# Patient Record
Sex: FEMALE | Race: WHITE | URBAN - METROPOLITAN AREA
[De-identification: names, ages, dates, MRNs, and addresses within clinical notes are randomized per-mention and may not be internally consistent; named-entity substitution may affect disease eponyms.]

---

## 2022-11-20 ENCOUNTER — EMERGENCY (EMERGENCY)
Facility: HOSPITAL | Age: 82
LOS: 1 days | Discharge: HOME | End: 2022-11-20
Attending: STUDENT IN AN ORGANIZED HEALTH CARE EDUCATION/TRAINING PROGRAM | Admitting: STUDENT IN AN ORGANIZED HEALTH CARE EDUCATION/TRAINING PROGRAM
Payer: MEDICARE

## 2022-11-20 VITALS
DIASTOLIC BLOOD PRESSURE: 60 MMHG | RESPIRATION RATE: 18 BRPM | HEART RATE: 98 BPM | SYSTOLIC BLOOD PRESSURE: 102 MMHG | OXYGEN SATURATION: 99 %

## 2022-11-20 VITALS
RESPIRATION RATE: 18 BRPM | OXYGEN SATURATION: 93 % | HEART RATE: 65 BPM | DIASTOLIC BLOOD PRESSURE: 53 MMHG | SYSTOLIC BLOOD PRESSURE: 111 MMHG | TEMPERATURE: 99 F

## 2022-11-20 DIAGNOSIS — E78.5 HYPERLIPIDEMIA, UNSPECIFIED: ICD-10-CM
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
## 2022-11-20 DIAGNOSIS — S40.021A CONTUSION OF RIGHT UPPER ARM, INITIAL ENCOUNTER: ICD-10-CM

## 2022-11-20 DIAGNOSIS — I48.91 UNSPECIFIED ATRIAL FIBRILLATION: ICD-10-CM

## 2022-11-20 DIAGNOSIS — S42.251A DISPLACED FRACTURE OF GREATER TUBEROSITY OF RIGHT HUMERUS, INITIAL ENCOUNTER FOR CLOSED FRACTURE: ICD-10-CM
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
## 2022-11-20 DIAGNOSIS — Z20.822 CONTACT WITH AND (SUSPECTED) EXPOSURE TO COVID-19: ICD-10-CM

## 2022-11-20 DIAGNOSIS — Z86.73 PERSONAL HISTORY OF TRANSIENT ISCHEMIC ATTACK (TIA), AND CEREBRAL INFARCTION WITHOUT RESIDUAL DEFICITS: ICD-10-CM

## 2022-11-20 DIAGNOSIS — Z91.018 ALLERGY TO OTHER FOODS: ICD-10-CM

## 2022-11-20 DIAGNOSIS — Z74.01 BED CONFINEMENT STATUS: ICD-10-CM

## 2022-11-20 DIAGNOSIS — Z79.01 LONG TERM (CURRENT) USE OF ANTICOAGULANTS: ICD-10-CM

## 2022-11-20 DIAGNOSIS — I10 ESSENTIAL (PRIMARY) HYPERTENSION: ICD-10-CM

## 2022-11-20 DIAGNOSIS — F03.90 UNSPECIFIED DEMENTIA WITHOUT BEHAVIORAL DISTURBANCE: ICD-10-CM

## 2022-11-20 DIAGNOSIS — E11.9 TYPE 2 DIABETES MELLITUS WITHOUT COMPLICATIONS: ICD-10-CM

## 2022-11-20 LAB
ALBUMIN SERPL ELPH-MCNC: 2.9 G/DL — LOW (ref 3.3–5)
ALP SERPL-CCNC: 113 U/L — SIGNIFICANT CHANGE UP (ref 40–120)
ALT FLD-CCNC: 10 U/L — SIGNIFICANT CHANGE UP (ref 10–45)
ANION GAP SERPL CALC-SCNC: 8 MMOL/L — SIGNIFICANT CHANGE UP (ref 5–17)
APTT BLD: 39.5 SEC — HIGH (ref 27.5–35.5)
AST SERPL-CCNC: 16 U/L — SIGNIFICANT CHANGE UP (ref 10–40)
BASOPHILS # BLD AUTO: 0.05 K/UL — SIGNIFICANT CHANGE UP (ref 0–0.2)
BASOPHILS NFR BLD AUTO: 0.5 % — SIGNIFICANT CHANGE UP (ref 0–2)
BILIRUB SERPL-MCNC: 0.2 MG/DL — SIGNIFICANT CHANGE UP (ref 0.2–1.2)
BUN SERPL-MCNC: 30 MG/DL — HIGH (ref 7–23)
CALCIUM SERPL-MCNC: 8.9 MG/DL — SIGNIFICANT CHANGE UP (ref 8.4–10.5)
CHLORIDE SERPL-SCNC: 106 MMOL/L — SIGNIFICANT CHANGE UP (ref 96–108)
CO2 SERPL-SCNC: 28 MMOL/L — SIGNIFICANT CHANGE UP (ref 22–31)
CREAT SERPL-MCNC: 1.01 MG/DL — SIGNIFICANT CHANGE UP (ref 0.5–1.3)
EGFR: 55 ML/MIN/1.73M2 — LOW
EOSINOPHIL # BLD AUTO: 0.11 K/UL — SIGNIFICANT CHANGE UP (ref 0–0.5)
EOSINOPHIL NFR BLD AUTO: 1 % — SIGNIFICANT CHANGE UP (ref 0–6)
GLUCOSE SERPL-MCNC: 172 MG/DL — HIGH (ref 70–99)
HCT VFR BLD CALC: 30.4 % — LOW (ref 34.5–45)
HGB BLD-MCNC: 9 G/DL — LOW (ref 11.5–15.5)
IMM GRANULOCYTES NFR BLD AUTO: 0.7 % — SIGNIFICANT CHANGE UP (ref 0–0.9)
INR BLD: 1.51 — HIGH (ref 0.88–1.16)
LYMPHOCYTES # BLD AUTO: 1.35 K/UL — SIGNIFICANT CHANGE UP (ref 1–3.3)
LYMPHOCYTES # BLD AUTO: 12.8 % — LOW (ref 13–44)
MCHC RBC-ENTMCNC: 23.4 PG — LOW (ref 27–34)
MCHC RBC-ENTMCNC: 29.6 GM/DL — LOW (ref 32–36)
MCV RBC AUTO: 79.2 FL — LOW (ref 80–100)
MONOCYTES # BLD AUTO: 0.76 K/UL — SIGNIFICANT CHANGE UP (ref 0–0.9)
MONOCYTES NFR BLD AUTO: 7.2 % — SIGNIFICANT CHANGE UP (ref 2–14)
NEUTROPHILS # BLD AUTO: 8.17 K/UL — HIGH (ref 1.8–7.4)
NEUTROPHILS NFR BLD AUTO: 77.8 % — HIGH (ref 43–77)
NRBC # BLD: 0 /100 WBCS — SIGNIFICANT CHANGE UP (ref 0–0)
PLATELET # BLD AUTO: 351 K/UL — SIGNIFICANT CHANGE UP (ref 150–400)
POTASSIUM SERPL-MCNC: 3.8 MMOL/L — SIGNIFICANT CHANGE UP (ref 3.5–5.3)
POTASSIUM SERPL-SCNC: 3.8 MMOL/L — SIGNIFICANT CHANGE UP (ref 3.5–5.3)
PROT SERPL-MCNC: 6.6 G/DL — SIGNIFICANT CHANGE UP (ref 6–8.3)
PROTHROM AB SERPL-ACNC: 18 SEC — HIGH (ref 10.5–13.4)
RBC # BLD: 3.84 M/UL — SIGNIFICANT CHANGE UP (ref 3.8–5.2)
RBC # FLD: 15.7 % — HIGH (ref 10.3–14.5)
SARS-COV-2 RNA SPEC QL NAA+PROBE: NEGATIVE — SIGNIFICANT CHANGE UP
SODIUM SERPL-SCNC: 142 MMOL/L — SIGNIFICANT CHANGE UP (ref 135–145)
WBC # BLD: 10.51 K/UL — HIGH (ref 3.8–10.5)
WBC # FLD AUTO: 10.51 K/UL — HIGH (ref 3.8–10.5)

## 2022-11-20 PROCEDURE — 36415 COLL VENOUS BLD VENIPUNCTURE: CPT

## 2022-11-20 PROCEDURE — 87635 SARS-COV-2 COVID-19 AMP PRB: CPT

## 2022-11-20 PROCEDURE — 73030 X-RAY EXAM OF SHOULDER: CPT | Mod: 26,RT

## 2022-11-20 PROCEDURE — 73030 X-RAY EXAM OF SHOULDER: CPT

## 2022-11-20 PROCEDURE — 80053 COMPREHEN METABOLIC PANEL: CPT

## 2022-11-20 PROCEDURE — 85730 THROMBOPLASTIN TIME PARTIAL: CPT

## 2022-11-20 PROCEDURE — 85610 PROTHROMBIN TIME: CPT

## 2022-11-20 PROCEDURE — 99284 EMERGENCY DEPT VISIT MOD MDM: CPT | Mod: 25

## 2022-11-20 PROCEDURE — 73060 X-RAY EXAM OF HUMERUS: CPT

## 2022-11-20 PROCEDURE — 99284 EMERGENCY DEPT VISIT MOD MDM: CPT

## 2022-11-20 PROCEDURE — 85025 COMPLETE CBC W/AUTO DIFF WBC: CPT

## 2022-11-20 PROCEDURE — 73060 X-RAY EXAM OF HUMERUS: CPT | Mod: 26,RT

## 2022-11-20 NOTE — ED PROVIDER NOTE - CARE PROVIDER_API CALL
Thaddeus Powers)  Orthopaedic Surgery; Sports Medicine  [address] [place] [address]  Phone: [phone]  Fax: [phone])-  Follow Up Time: 1-3 Days

## 2022-11-20 NOTE — ED PROVIDER NOTE - MUSCULOSKELETAL, MLM
+ tenderness and burising to entire right humerus region. mild edema. limited rom. no deformity. distal NVI. remainder of RUE non tender.

## 2022-11-20 NOTE — ED ADULT NURSE NOTE - OBJECTIVE STATEMENT
82 y.o F a&ox0 BIBA from home c.o arm pain. nonambulatory, bedbound PT presents with R arm hematoma since friday. R shoudler swelling and R upper arm hematoma noted. 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 no recent falls. PT has 24 hr caregivers, caregivers at bedside. PT states "mommy" to any question.

## 2022-11-20 NOTE — ED PROVIDER NOTE - NSFOLLOWUPINSTRUCTIONS_ED_ALL_ED_FT
Follow up with orthopedics this week. Keep arm in sling. rest, ice and elevation. tylenol as needed for pain.                                                                                                                                                                                                                                                                                                                        Humerus Fracture Treated With Immobilization    Right arm and hand showing skeleton with a humerus fracture.   A humerus fracture is a break in the large bone in the upper arm (humerus). If the joint is stable and the bones are still in their normal position (nondisplaced), the injury may be treated with immobilization. This involves the use of a cast, splint, or sling to hold your arm in place. Immobilization ensures that your bones continue to stay in the correct position while your arm is healing.      What are the causes?    This condition may be caused by:  •A fall.      •A hard, direct hit to the arm.      •A motor vehicle accident.        What increases the risk?    The following factors may make you more likely to develop this condition:  •Having a disease that makes the bones thin and weak.      •Being elderly.        What are the signs or symptoms?    Symptoms of this condition include:  •Pain.      •Swelling.      •Bruising.      •Not being able to move your arm normally.        How is this diagnosed?    This condition may be diagnosed based on:  •A physical exam.      •X-rays of your upper arm, elbow, and shoulder.      •CT scan.        How is this treated?    Treatment for this condition involves wearing a cast, splint, or sling until the injured area is stable enough for you to begin range-of-motion exercises. You may also be prescribed pain medicine.      Follow these instructions at home:    If you have a nonremovable cast or splint:     • Do not put pressure on any part of the cast or splint until it is fully hardened. This may take several hours.      • Do not stick anything inside the cast or splint to scratch your skin. Doing that increases your risk of infection.      •Check the skin around the cast or splint every day. Tell your health care provider about any concerns.      •You may put lotion on dry skin around the edges of the cast or splint. Do not put lotion on the skin underneath the cast or splint.      •Keep the cast or splint clean and dry.      If you have a removable splint or sling:     •Wear the splint or sling as told by your health care provider. Remove it only as told by your health care provider.      •Check the skin around the splint or sling every day. Tell your health care provider about any concerns.      •Loosen the splint or sling if your fingers tingle, become numb, or turn cold and blue.      •Keep the splint or sling clean and dry.      Bathing     • Do not take baths, swim, or use a hot tub until your health care provider approves. Ask your health care provider if you may take showers. You may only be allowed to take sponge baths.    •If the cast, splint, or sling is not waterproof:  •Do not let it get wet.      •Cover it with a watertight covering when you take a bath or shower.          Managing pain, stiffness, and swelling   Bag of ice on a towel on the skin.  •If directed, put ice on the injured area. To do this:  •If you have a removable splint or sling, remove it as told by your health care provider.      •Put ice in a plastic bag.      •Place a towel between your skin and the bag or between your nonremovable cast or sling and the bag.      •Leave the ice on for 20 minutes, 2–3 times a day.      •Remove the ice if your skin turns bright red. This is very important. If you cannot feel pain, heat, or cold, you have a greater risk of damage to the area.        •Move your fingers often to reduce stiffness and swelling.      •Raise the injured area above the level of your heart while you are sitting or lying down.      Driving     • Do not drive or operate machinery while taking prescription pain medicine.      •Ask your health care provider when it is safe to drive if you have a cast, splint, or sling on your arm.      Activity     • Do not lift anything until your health care provider says that it is safe.      •Return to your normal activities as told by your health care provider. Ask your health care provider what activities are safe for you.      •Do range-of-motion exercises only as told by your health care provider or physical therapist.      General instructions     • Do not use any products that contain nicotine or tobacco. These products include cigarettes, chewing tobacco, and vaping devices, such as e-cigarettes. These can delay bone healing. If you need help quitting, ask your health care provider.      •Take over-the-counter and prescription medicines only as told by your health care provider.    •Ask your health care provider if the medicine prescribed to you:•Can cause constipation. You may need to take these actions to prevent or treat constipation:  •Drink enough fluid to keep your urine pale yellow.      •Take over-the-counter or prescription medicines.      •Eat foods that are high in fiber, such as beans, whole grains, and fresh fruits and vegetables.      •Limit foods that are high in fat and processed sugars, such as fried or sweet foods.          •Keep all follow-up visits. This is important.        Contact a health care provider if:    •You have any new pain, swelling, or bruising.      •Your pain, swelling, and bruising do not improve.      •Your cast, splint, or sling becomes loose or damaged.        Get help right away if:    •Your skin or fingers on your injured arm turn blue or gray.      •Your arm feels cold or numb.      •You have severe pain in your injured arm.        Summary    •A humerus fracture is a break in the large bone in the upper arm.      •Immobilization involves the use of a cast, splint, or sling to hold your arm in place while the injury heals.      •Wear a splint or sling as told by your health care provider. Remove it only as told by your health care provider.      •Move your fingers often to reduce stiffness and swelling.      This information is not intended to replace advice given to you by your health care provider. Make sure you discuss any questions you have with your health care provider.      Document Revised: 01/31/2022 Document Reviewed: 01/31/2022    [employer] Patient Education © 2022 [employer]

## 2022-11-20 NOTE — ED PROVIDER NOTE - PATIENT PORTAL LINK FT
You can access the FollowMyHealth Patient Portal offered by [facility] by registering at the following website: [email or web address]. By joining [employer]’s FollowMyHealth portal, you will also be able to view your health information using other applications (apps) compatible with our system.

## 2022-11-20 NOTE — ED PROVIDER NOTE - OBJECTIVE STATEMENT
83 y/o female with hx of dementia and CVA on eloquis brought in by HHA c/o bruising to right upper arm. As per aides noted discomforted and swelling to arm past couple of days and this am developed bruising to upper arm. no known trauma. no falls. pt bed bound. pt unable to contribute to hx due to dementia. Pt not orientated at baseline as per A. 83 y/o female with hx of dementia, HTN, HLD, CVA, DM and A fib on Eliquis brought in by HHA c/o bruising to right upper arm. As per aides noted discomforted and swelling to arm past couple of days and this am developed bruising to upper arm. no known trauma. no falls. pt bed bound. pt unable to contribute to hx due to dementia. Pt not orientated at baseline as per HHA.

## 2022-11-20 NOTE — ED ADULT TRIAGE NOTE - CHIEF COMPLAINT QUOTE
Per aide, patient had bruising to right upper arm since Friday and has gotten worse Per aide, patient has bruising to right upper arm since Friday and has gotten worse

## 2022-11-20 NOTE — ED PROVIDER NOTE - CLINICAL SUMMARY MEDICAL DECISION MAKING FREE TEXT BOX
right upper arm bruising. HHA denies trauma. neurovasuclar intact. labs noted. mild anemia. glucose elevated no gap to suggest dka. x-ray done and + humeral neck fx. pt placed in sling. will d/c home to f/u with ortho. d/w HHA and son care plan and return precautions. tylenol prn pain.